# Patient Record
Sex: MALE | Race: WHITE | NOT HISPANIC OR LATINO
[De-identification: names, ages, dates, MRNs, and addresses within clinical notes are randomized per-mention and may not be internally consistent; named-entity substitution may affect disease eponyms.]

---
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
## 2023-10-31 NOTE — ED PEDIATRIC NURSE NOTE - NS ED NURSE DC INFO COMPLEXITY
Addended by: SHARON HUERTAS on: 10/31/2023 08:18 AM     Modules accepted: Orders    
Simple: Patient demonstrates quick and easy understanding